# Patient Record
Sex: MALE | Race: OTHER | HISPANIC OR LATINO | URBAN - METROPOLITAN AREA
[De-identification: names, ages, dates, MRNs, and addresses within clinical notes are randomized per-mention and may not be internally consistent; named-entity substitution may affect disease eponyms.]

---

## 2017-01-05 ENCOUNTER — EMERGENCY (EMERGENCY)
Facility: HOSPITAL | Age: 27
LOS: 1 days | Discharge: HOME | End: 2017-01-05
Admitting: EMERGENCY MEDICINE
Payer: MEDICAID

## 2017-01-05 VITALS
SYSTOLIC BLOOD PRESSURE: 121 MMHG | TEMPERATURE: 98 F | RESPIRATION RATE: 19 BRPM | DIASTOLIC BLOOD PRESSURE: 86 MMHG | HEART RATE: 92 BPM | OXYGEN SATURATION: 100 %

## 2017-01-05 DIAGNOSIS — F12.129 CANNABIS ABUSE WITH INTOXICATION, UNSPECIFIED: ICD-10-CM

## 2017-01-05 DIAGNOSIS — F20.9 SCHIZOPHRENIA, UNSPECIFIED: ICD-10-CM

## 2017-01-05 PROCEDURE — 90792 PSYCH DIAG EVAL W/MED SRVCS: CPT

## 2017-01-05 PROCEDURE — 99284 EMERGENCY DEPT VISIT MOD MDM: CPT

## 2017-01-05 NOTE — ED BEHAVIORAL HEALTH ASSESSMENT NOTE - OTHER
peers CVM neutral consider using less drugs "I am feeling good!" concrete maintains attention with effort low end of average chronically limited looks happy

## 2017-01-05 NOTE — ED BEHAVIORAL HEALTH NOTE - BEHAVIORAL HEALTH NOTE
This worker has contacted Mario at [facility] who has arranged taxi  for the patient by 811 transport at approximately 930, cab will call nurses station upon arrival. Confirmation # [id number].
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
## 2017-01-05 NOTE — ED ADULT NURSE NOTE - OBJECTIVE STATEMENT
PT to  area via EMS [facility] with another [facility] at the facility. Sent for psych evaluation. No signs of injury or trauma. Denies drug or alcohol use. Denies SI or HI. Calm and cooperative in triage.
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
## 2017-01-05 NOTE — ED BEHAVIORAL HEALTH ASSESSMENT NOTE - SAFETY PLAN DETAILS
Advised to return to ED or call 911 for any suicidal ideation, homicidal thoughts or worsening symptoms. Verbalized Understanding of instructions

## 2017-01-05 NOTE — ED BEHAVIORAL HEALTH ASSESSMENT NOTE - SUICIDE PROTECTIVE FACTORS
Fear of death or dying due to pain/suffering/Identifies reasons for living/Supportive social network or family

## 2017-01-05 NOTE — ED BEHAVIORAL HEALTH ASSESSMENT NOTE - RISK ASSESSMENT
Protective factors include no suicide attempts, use of long acting injectable, medication compliance, no access to guns, no global insomnia, supportive psychiatric residence. Risk factors include history of chronic aggressive behaviors, substance abuse, psychotic disorder, chronically poor insight. While pt has chronic risk factors, his many protective factors mitigate risks, and at present pt is not psychotic, manic, depressed or suicidal/homicidal. He does not meet criteria for inpatient psychiatric admission, and will be discharged to his psychiatric residence with plan to f/u with psychiatrist as scheduled and continue POLANCO as planned. He agrees to return to ED if he has worsening sx/SI/HI. Protective factors include no suicide attempts, use of long acting injectable, medication compliance, no access to guns, no global insomnia, supportive psychiatric residence, young, no major medical issues/no chronic pain. Risk factors include history of chronic aggressive behaviors, male gender; single, chronic active substance abuse, psychotic disorder, chronically poor insight. While pt has chronic risk factors, his many protective factors mitigate risks, and at present pt is not psychotic, manic, depressed or suicidal/homicidal. No acute risk factors identified; residual high on cannabis but clinically sober enough to tolerate assessment.

## 2017-01-05 NOTE — ED PROVIDER NOTE - MEDICAL DECISION MAKING DETAILS
This is a 26 year old Male PMHx psychosis BIBA for psych eval r/t aggression . Medical evaluation performed. There is no clinical evidence of intoxication or any acute medical problem requiring immediate intervention. Final disposition will be determined by psychiatrist.
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
## 2017-01-05 NOTE — ED BEHAVIORAL HEALTH ASSESSMENT NOTE - DESCRIPTION
calm cooperative pleasant, laughing and smiling a lot and at times giggling with glassy eyes consistent with cannabis intoxication Patient is well known for none see hpi

## 2021-01-03 ENCOUNTER — INPATIENT (INPATIENT)
Facility: HOSPITAL | Age: 31
LOS: 0 days | Discharge: ANOTHER HOSPITAL | End: 2021-01-04
Attending: PSYCHIATRY & NEUROLOGY | Admitting: PSYCHIATRY & NEUROLOGY
Payer: COMMERCIAL

## 2021-01-03 VITALS
SYSTOLIC BLOOD PRESSURE: 156 MMHG | TEMPERATURE: 98 F | DIASTOLIC BLOOD PRESSURE: 101 MMHG | HEART RATE: 68 BPM | OXYGEN SATURATION: 100 % | RESPIRATION RATE: 18 BRPM

## 2021-01-03 DIAGNOSIS — F25.9 SCHIZOAFFECTIVE DISORDER, UNSPECIFIED: ICD-10-CM

## 2021-01-03 LAB
ALBUMIN SERPL ELPH-MCNC: 3.9 G/DL — SIGNIFICANT CHANGE UP (ref 3.3–5)
ALP SERPL-CCNC: 83 U/L — SIGNIFICANT CHANGE UP (ref 40–120)
ALT FLD-CCNC: 18 U/L — SIGNIFICANT CHANGE UP (ref 4–41)
ANION GAP SERPL CALC-SCNC: 10 MMOL/L — SIGNIFICANT CHANGE UP (ref 7–14)
AST SERPL-CCNC: 19 U/L — SIGNIFICANT CHANGE UP (ref 4–40)
B PERT DNA SPEC QL NAA+PROBE: SIGNIFICANT CHANGE UP
BASOPHILS # BLD AUTO: 0.05 K/UL — SIGNIFICANT CHANGE UP (ref 0–0.2)
BASOPHILS NFR BLD AUTO: 0.8 % — SIGNIFICANT CHANGE UP (ref 0–2)
BILIRUB SERPL-MCNC: 0.4 MG/DL — SIGNIFICANT CHANGE UP (ref 0.2–1.2)
BUN SERPL-MCNC: 19 MG/DL — SIGNIFICANT CHANGE UP (ref 7–23)
C PNEUM DNA SPEC QL NAA+PROBE: SIGNIFICANT CHANGE UP
CALCIUM SERPL-MCNC: 9 MG/DL — SIGNIFICANT CHANGE UP (ref 8.4–10.5)
CHLORIDE SERPL-SCNC: 102 MMOL/L — SIGNIFICANT CHANGE UP (ref 98–107)
CO2 SERPL-SCNC: 27 MMOL/L — SIGNIFICANT CHANGE UP (ref 22–31)
CREAT SERPL-MCNC: 1.05 MG/DL — SIGNIFICANT CHANGE UP (ref 0.5–1.3)
EOSINOPHIL # BLD AUTO: 0.29 K/UL — SIGNIFICANT CHANGE UP (ref 0–0.5)
EOSINOPHIL NFR BLD AUTO: 4.5 % — SIGNIFICANT CHANGE UP (ref 0–6)
FLUAV H1 2009 PAND RNA SPEC QL NAA+PROBE: SIGNIFICANT CHANGE UP
FLUAV H1 RNA SPEC QL NAA+PROBE: SIGNIFICANT CHANGE UP
FLUAV H3 RNA SPEC QL NAA+PROBE: SIGNIFICANT CHANGE UP
FLUAV SUBTYP SPEC NAA+PROBE: SIGNIFICANT CHANGE UP
FLUBV RNA SPEC QL NAA+PROBE: SIGNIFICANT CHANGE UP
GLUCOSE SERPL-MCNC: 124 MG/DL — HIGH (ref 70–99)
HADV DNA SPEC QL NAA+PROBE: SIGNIFICANT CHANGE UP
HCOV PNL SPEC NAA+PROBE: SIGNIFICANT CHANGE UP
HCT VFR BLD CALC: 39.3 % — SIGNIFICANT CHANGE UP (ref 39–50)
HGB BLD-MCNC: 13.4 G/DL — SIGNIFICANT CHANGE UP (ref 13–17)
HMPV RNA SPEC QL NAA+PROBE: SIGNIFICANT CHANGE UP
HPIV1 RNA SPEC QL NAA+PROBE: SIGNIFICANT CHANGE UP
HPIV2 RNA SPEC QL NAA+PROBE: SIGNIFICANT CHANGE UP
HPIV3 RNA SPEC QL NAA+PROBE: SIGNIFICANT CHANGE UP
HPIV4 RNA SPEC QL NAA+PROBE: SIGNIFICANT CHANGE UP
IANC: 2.74 K/UL — SIGNIFICANT CHANGE UP (ref 1.5–8.5)
IMM GRANULOCYTES NFR BLD AUTO: 0.3 % — SIGNIFICANT CHANGE UP (ref 0–1.5)
LYMPHOCYTES # BLD AUTO: 2.73 K/UL — SIGNIFICANT CHANGE UP (ref 1–3.3)
LYMPHOCYTES # BLD AUTO: 42.1 % — SIGNIFICANT CHANGE UP (ref 13–44)
MCHC RBC-ENTMCNC: 30.2 PG — SIGNIFICANT CHANGE UP (ref 27–34)
MCHC RBC-ENTMCNC: 34.1 GM/DL — SIGNIFICANT CHANGE UP (ref 32–36)
MCV RBC AUTO: 88.7 FL — SIGNIFICANT CHANGE UP (ref 80–100)
MONOCYTES # BLD AUTO: 0.66 K/UL — SIGNIFICANT CHANGE UP (ref 0–0.9)
MONOCYTES NFR BLD AUTO: 10.2 % — SIGNIFICANT CHANGE UP (ref 2–14)
NEUTROPHILS # BLD AUTO: 2.74 K/UL — SIGNIFICANT CHANGE UP (ref 1.8–7.4)
NEUTROPHILS NFR BLD AUTO: 42.1 % — LOW (ref 43–77)
NRBC # BLD: 0 /100 WBCS — SIGNIFICANT CHANGE UP
NRBC # FLD: 0 K/UL — SIGNIFICANT CHANGE UP
PCP SPEC-MCNC: SIGNIFICANT CHANGE UP
PCP SPEC-MCNC: SIGNIFICANT CHANGE UP
PLATELET # BLD AUTO: 165 K/UL — SIGNIFICANT CHANGE UP (ref 150–400)
POTASSIUM SERPL-MCNC: 3.8 MMOL/L — SIGNIFICANT CHANGE UP (ref 3.5–5.3)
POTASSIUM SERPL-SCNC: 3.8 MMOL/L — SIGNIFICANT CHANGE UP (ref 3.5–5.3)
PROT SERPL-MCNC: 6.5 G/DL — SIGNIFICANT CHANGE UP (ref 6–8.3)
RAPID RVP RESULT: SIGNIFICANT CHANGE UP
RBC # BLD: 4.43 M/UL — SIGNIFICANT CHANGE UP (ref 4.2–5.8)
RBC # FLD: 12.8 % — SIGNIFICANT CHANGE UP (ref 10.3–14.5)
RSV RNA SPEC QL NAA+PROBE: SIGNIFICANT CHANGE UP
RV+EV RNA SPEC QL NAA+PROBE: SIGNIFICANT CHANGE UP
SARS-COV-2 RNA SPEC QL NAA+PROBE: SIGNIFICANT CHANGE UP
SODIUM SERPL-SCNC: 139 MMOL/L — SIGNIFICANT CHANGE UP (ref 135–145)
TOXICOLOGY SCREEN, DRUGS OF ABUSE, SERUM RESULT: SIGNIFICANT CHANGE UP
TSH SERPL-MCNC: 1.99 UIU/ML — SIGNIFICANT CHANGE UP (ref 0.27–4.2)
WBC # BLD: 6.49 K/UL — SIGNIFICANT CHANGE UP (ref 3.8–10.5)
WBC # FLD AUTO: 6.49 K/UL — SIGNIFICANT CHANGE UP (ref 3.8–10.5)

## 2021-01-03 PROCEDURE — 90792 PSYCH DIAG EVAL W/MED SRVCS: CPT

## 2021-01-03 PROCEDURE — 99285 EMERGENCY DEPT VISIT HI MDM: CPT

## 2021-01-03 RX ORDER — HALOPERIDOL DECANOATE 100 MG/ML
5 INJECTION INTRAMUSCULAR ONCE
Refills: 0 | Status: COMPLETED | OUTPATIENT
Start: 2021-01-03 | End: 2021-01-03

## 2021-01-03 RX ADMIN — Medication 2 MILLIGRAM(S): at 12:53

## 2021-01-03 RX ADMIN — HALOPERIDOL DECANOATE 5 MILLIGRAM(S): 100 INJECTION INTRAMUSCULAR at 12:53

## 2021-01-03 NOTE — ED BEHAVIORAL HEALTH ASSESSMENT NOTE - VIOLENCE RISK FACTORS:
Affective dysregulation/Impulsivity/Lack of insight into violence risk/need for treatment/Noncompliance with treatment/Irritability/Elopement history or risk

## 2021-01-03 NOTE — ED BEHAVIORAL HEALTH ASSESSMENT NOTE - DIFFERENTIAL
Schizophrenia, RO substance induced psychosis, RO schizoaffective disorder bipolar type RO schizoaffective disorder bipolar type, Schizophrenia, RO substance induced psychosis,

## 2021-01-03 NOTE — ED ADULT NURSE NOTE - ED STAT RN HANDOFF WHERE
Pt taken to [facility], handoff given by KIRILL Hendrix. Transported by [facility]

## 2021-01-03 NOTE — ED BEHAVIORAL HEALTH ASSESSMENT NOTE - PSYCHIATRIC ISSUES AND PLAN (INCLUDE STANDING AND PRN MEDICATION)
May give haldol 5mg PO Q4H PRN psychosis/agitation, may give Ativan 2mg PO Q6H PRN anxiety, May give Haldol 5mg IM acute agitation/aggression Q6H, May give Ativan IM for acute agitation/aggression Q6H May give haldol 5mg PO Q4H PRN psychosis/agitation, may give Ativan 2mg PO Q6H PRN anxiety, May give Haldol 5mg IM acute agitation/aggression Q6H, May give Ativan 2mg IM for acute agitation/aggression Q6H May give haldol 5mg PO Q4H PRN psychosis/agitation, may give Ativan 2mg PO Q6H PRN anxiety, May give Haldol 5mg IM acute agitation/aggression Q8H, May give Ativan 2mg IM for acute agitation/aggression Q8H recommend risperdal 1mg po qhs, PRNS: Haldol 5mg po/im/q6hrs PRN, benadryl 50mg po/im and ativan 2mg po/im

## 2021-01-03 NOTE — ED ADULT TRIAGE NOTE - CHIEF COMPLAINT QUOTE
Arrives as EDP with police, not cooperative, refusing to answer questions, muttering coherently under his breath.

## 2021-01-03 NOTE — ED BEHAVIORAL HEALTH ASSESSMENT NOTE - HPI (INCLUDE ILLNESS QUALITY, SEVERITY, DURATION, TIMING, CONTEXT, MODIFYING FACTORS, ASSOCIATED SIGNS AND SYMPTOMS)
30-year-old-Kosovan/[facility] male, with self-reported Hx of Schizophrenia, Bipolar, hx of recent discharge from inpatient [facility], unclear current psychiatric medications, unknown medical history, hx of MJ use (+Utox), who was brought in from the streets by police custody for emotional disturbance.     As per staff, pt upon arrival required IM injection for agitation. Pt seen and spoken to by writer, observed pt to be a limited historian, reports that he was running outside and police came. he is not able to provide any more information upon this. Discussed past hospitalizations, reports that he was hospitalized at [facility] for the past few weeks, that he was recently discharged to the [address] [place] [address] to look for his ID. After leaving the [facility], he noted that he was outside asking and began to dance stating, "I danced like a monkey and I wanted to get tickled." Pt at this time, pt stood up and began to dance asking be tickled for about 2 minutes. he did not provide more information and refused to answer any more questions.     Writer attempted to contact mother, but there was not response and a message was left for mother to return call. 30-year-old-[place]ican/[facility] male, with self-reported Hx of Schizophrenia, Bipolar, hx of recent discharge from inpatient [facility], unclear current psychiatric medications, unknown medical history, hx of MJ use (+Utox), who was brought in from the streets by police custody for emotional disturbance.     As per staff, pt upon arrival required IM injection for agitation. Pt seen and spoken to by writer, observed pt to be a limited historian, reports that he was running outside and police came. he is not able to provide any more information upon this. Discussed past hospitalizations, reports that he was hospitalized at [facility] for the past few weeks, that he was recently discharged to the [address] [place] [address] to look for his ID. [facility], he noted that he was outside asking and began to dance stating, "I danced like a monkey and I wanted to get tickled." Pt at this time, pt stood up and began to dance asking be tickled for about 2 minutes. he did not provide more information, denies suicidal ideation/homicidal ideation, auditory hallucinations or visual hallucinations and refused to answer any more questions.     Writer attempted to contact mother, but there was not response and a message was left for mother to return call in Slovak-preferred language based on voicemail.    Psyckes was assessed, but no information was available 30-year-[facility], with self-reported Hx of Schizophrenia, Bipolar, hx of recent discharge from inpatient [facility], unclear current psychiatric medications, unknown medical history, hx of MJ use (+Utox), who was brought in from the streets by police custody for emotional disturbance.     As per staff, pt upon arrival required IM injection for agitation. Pt seen and spoken to by writer, observed pt to be a limited historian, reports that he was running outside and police came. he is not able to provide any more information upon this. Discussed past hospitalizations, reports that he was hospitalized at [facility] for the past few weeks, that he was recently discharged to the [address] [place] [address] to look for his ID. After leaving the [facility], he noted that he was outside asking and began to dance stating, "I danced like a monkey and I wanted to get tickled." Pt at this time, pt stood up and began to dance asking be tickled for about 2 minutes. he did not provide more information, denies suicidal ideation/homicidal ideation, auditory hallucinations or visual hallucinations and refused to answer any more questions.     Writer attempted to contact mother, but there was not response and a message was left for mother to return call in Tristanian-preferred language based on voicemail.    reviewed medical record: Pt discharged from [facility] to shelter ([phone]) and referral has been made to [facility] outpatient program [address] [place] [address], [phone] on 1231. Pt was discharged on Klonopin 1mg PO BID x 7 days #14, Cogentin 1mg PO BID x 7 days, haldol 10m gPO BID x 7 days, Depakote 250mg ECC, 3 tablets BID TDD 1500mg, Lithium 300m PO BID and scheduled to  medications at [facility]. [address] [facility]. Referral was also made for Dr. Bogdan JACKSON/ [facility] [address] [facility] [address] [phone]    Reviewed labs: Hx of elevated CPK up to 1500, advised to push fluids and limit IM injections    Psyckes was assessed, but no information was available 30-[facility], with self-reported Hx of Schizophrenia, Bipolar, hx of recent discharge from inpatient [facility], unclear current psychiatric medications, unknown medical history, hx of MJ use (+Utox), who was brought in from the streets by police custody for emotional disturbance.     As per staff, pt upon arrival required IM injection for agitation. Pt seen and spoken to by writer, observed pt to be a limited historian, reports that he was running outside and police came. he is not able to provide any more information upon this. Discussed past hospitalizations, reports that he was hospitalized at [facility] for the past few weeks, that he was recently discharged to the [address] [place] [address] to look for his ID. After leaving the [facility], he noted that he was outside asking and began to dance stating, "I danced like a monkey and I wanted to get tickled." Pt at this time, pt stood up and began to dance asking be tickled for about 2 minutes. he did not provide more information, denies suicidal ideation/homicidal ideation, auditory hallucinations or visual hallucinations and refused to answer any more questions.     Writer attempted to contact mother, but there was not response and a message was left for mother to return call in Salvadorean-preferred language based on voicemail.    reviewed medical record: Pt discharged from [facility] to shelter ([phone]) and referral has been made to [facility] outpatient program [address] [place] [address], [phone] on 1231. Pt was discharged on Klonopin 1mg PO BID x 7 days #14, Cogentin 1mg PO BID x 7 days, haldol 10m gPO BID x 7 days, Depakote 250mg ECC, 3 tablets BID TDD 1500mg, Lithium 300m PO BID and scheduled to  medications at [facility]. [address] [facility]. Referral was also made for Dr. Bogdan JACKSON/ [facility] [address] [facility] [address] [phone]    Reviewed labs: Hx of elevated CPK up to 1500, advised to push fluids and limit IM injections    Psyckes was assessed, but no information was available    Upon reassessment at 8am on 01/04/2021 - Patient was interviewed in his room. He was initially calm but as per overnight report, he was running around naked and required Haldol 5mg and Ativan 2mg IM at 1am and 220am. He states he slept well overnight, does not know why he's in the ER, denies doing drugs and denies any feelings of SI or depression. Patient states his family would not let him in the house and he came to the hospital to "Eat food and drink you!" At a point he just started to jump in his bed, laughing hysterically and started saying "Tickle me! Tickle me!". Pt is having trouble maintaining impulse control and is not appropriately caring for self. He also states he's not supposed to be taking any medications though was discharged from [facility] a few weeks ago.

## 2021-01-03 NOTE — ED BEHAVIORAL HEALTH ASSESSMENT NOTE - DESCRIPTION
agitation inially, +results with IM injection, more cooperative but presents oddly related    Vital Signs Last 24 Hrs  T(C): 36.7 (03 Jan 2021 12:22), Max: 36.7 (03 Jan 2021 12:22)  T(F): 98.1 (03 Jan 2021 12:22), Max: 98.1 (03 Jan 2021 12:22)  HR: 91 (03 Jan 2021 17:43) (68 - 91)  BP: 141/87 (03 Jan 2021 17:43) (141/87 - 156/101)  BP(mean): --  RR: 16 (03 Jan 2021 17:43) (16 - 18)  SpO2: 100% (03 Jan 2021 17:43) (100% - 100%) agitation initially, +results with IM injection, more cooperative but presents oddly related    Vital Signs Last 24 Hrs  T(C): 36.7 (03 Jan 2021 12:22), Max: 36.7 (03 Jan 2021 12:22)  T(F): 98.1 (03 Jan 2021 12:22), Max: 98.1 (03 Jan 2021 12:22)  HR: 91 (03 Jan 2021 17:43) (68 - 91)  BP: 141/87 (03 Jan 2021 17:43) (141/87 - 156/101)  BP(mean): --  RR: 16 (03 Jan 2021 17:43) (16 - 18)  SpO2: 100% (03 Jan 2021 17:43) (100% - 100%) unknown, pending collateral

## 2021-01-03 NOTE — ED ADULT NURSE NOTE - OBJECTIVE STATEMENT
Pt arrived to  in handcuffs accompanied by PD and EMS. Pt observed yelling incomprehensibly, unable to follow directions. Pt medicated with 5 haldol 2 ativan IM as ordered.  Pt unable to answer questions regarding S/I H/I A/H V/H. Pt changed into  clothing. Personal property collected and logged.

## 2021-01-03 NOTE — ED PROVIDER NOTE - CLINICAL SUMMARY MEDICAL DECISION MAKING FREE TEXT BOX
31 y/o M  hx Bipolar , Psychosis   Recently  discharged from [facility] inpatient psychiatry  3 days ago   Labs, Urine Tox/UA, EKG  Medical evaluation performed. There is no clinical evidence of intoxication or any acute medical problem requiring immediate intervention. Patient is awaiting psychiatric consultation. Final disposition will be determined by psychiatrist.  Awaiting inpatient psychiatric bed.

## 2021-01-03 NOTE — ED BEHAVIORAL HEALTH ASSESSMENT NOTE - NS ED [facility] [employer] MEDICAL RECORD REVIEWED YES RECORDS
Hospital chart
Alert and oriented to person, place and time, memory intact, behavior appropriate to situation, PERRL.

## 2021-01-03 NOTE — ED BEHAVIORAL HEALTH ASSESSMENT NOTE - CASE SUMMARY
unlikely that Patient decompensated in 3 days after inpatient discharge, though not impossible  - substance intox with psychosis primary diagnosis at this time. If so, he MSE should improve by the morning   - boarding in [facility] due to lack of beds in the system. haldol 5mg PO/IM q6hrs PRN, Ativan 2mg PO/IM q6hrs PRNs should suffice; trazodone 50mg PO qhs PRN for insomnia
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
## 2021-01-03 NOTE — ED BEHAVIORAL HEALTH ASSESSMENT NOTE - SUMMARY
30-year-old-German/[facility] male, with self-reported Hx of Schizophrenia, Bipolar, hx of recent discharge from inpatient [facility], unclear current psychiatric medications, unknown medical history, hx of MJ use (+Utox), who was brought in from the streets by police custody for emotional disturbance.     Pt presents as oddly related, very limited historian, appeared to be internally preoccupied and is not safe to be discharged. more information is needed to know about his baseline presentation, chronology of his mental illness, past medication trials/failures and past medical history. At this time, will board pt pending call/information from mother. 30-year-old-[place]/[facility] male, with self-reported Hx of Schizophrenia, Bipolar, hx of recent discharge from inpatient [facility], unclear current psychiatric medications, unknown medical history, hx of MJ use (+Utox), who was brought in from the streets by police custody for emotional disturbance. Pt presents as oddly related, very limited historian, appeared to be internally preoccupied and is not safe to be discharged. more information is needed to know about his baseline presentation, chronology of his mental illness, past medication trials/failures and past medical history. At this time, will board pt pending call/information from mother. 30-year-old-Malagasy/[facility] male, with self-reported Hx of Schizophrenia, Bipolar, hx of recent discharge from inpatient [facility], unclear current psychiatric medications, unknown medical history, hx of MJ use (+Utox), who was brought in from the streets by police custody for emotional disturbance. Pt presents as oddly related, very limited historian, appeared to be internally preoccupied and is not safe to be discharged. more information is needed to know about his baseline presentation, chronology of his mental illness, past medication trials/failures and past medical history. At this time, will board pt pending call/information from mother.    Upon re-assessment, Pt continues to present with disorganized thought process, poor impulse control, requiring multiple IMs overnight and making strange statements. Pt has been non-adherent with meds and will most likely require psychiatric hospitalization for re-stabilization.

## 2021-01-03 NOTE — ED BEHAVIORAL HEALTH ASSESSMENT NOTE - OTHER
housing initially uncooperative unable to assess none reported possible VH, observed to be gazing his eyes throughout the unit internal

## 2021-01-03 NOTE — ED PROVIDER NOTE - NSRECPHYSICIAN_ED_A_ED_FT
Additional Notes: Unclear etiology. Pt has history of cold urticaria, she may be using cold water to wash her face. Recommend that she make sure that the water is warm before washing her face. Also she should be more consistent with taking her antihistamine at least once daily (can increase up to TID). Discussed risks of sedation  with antihistamine use. Vanicream samples provided for patient to try as a moisturizer (since pt not using one currently). Detail Level: Simple Additional Notes: Mostly comedonal acne on exam. Given that patient has history of sensitive skin and contact allergies, will have her try OTC differin 0.1% gel every other night and increase to nightly as tolerated. Can start clindamycin 1% lotion in the AM. Gentle cleanser BID, moisturizer with SPF in AM and thicker one at night. \\nRTC in 2-3 months, sooner if worsening. Dr Landy Spence

## 2021-01-03 NOTE — ED ADULT NURSE REASSESSMENT NOTE - NS ED NURSE REASSESS COMMENT FT1
patient laying bed calm cooperative. as per NP Villa, patient offered water at this time. patient refuses " Im not thirsty" NP aware. blood specimens collected and sent.

## 2021-01-03 NOTE — ED BEHAVIORAL HEALTH ASSESSMENT NOTE - DETAILS
discharged from [facility] 1/2/21 information unknown BH provider made aware of plan message left for mother pt denies will attempt to call mother Handoff provided to L4 provider

## 2021-01-03 NOTE — ED BEHAVIORAL HEALTH ASSESSMENT NOTE - RISK ASSESSMENT
Risk factors include history of recent discharge from a psychiatric facility, substance abuse, no dependents, impulsivity, impaired insight & judgement, psychiatric diagnoses, agitation, Male, Young Adult,     Protective factors include no aggression history Low Acute Suicide Risk

## 2021-01-04 DIAGNOSIS — F25.0 SCHIZOAFFECTIVE DISORDER, BIPOLAR TYPE: ICD-10-CM

## 2021-01-04 LAB
SARS-COV-2 IGG SERPL QL IA: POSITIVE
SARS-COV-2 IGM SERPL IA-ACNC: 13 INDEX — HIGH

## 2021-01-04 PROCEDURE — 99213 OFFICE O/P EST LOW 20 MIN: CPT

## 2021-01-04 PROCEDURE — 99285 EMERGENCY DEPT VISIT HI MDM: CPT | Mod: 25

## 2021-01-04 PROCEDURE — 93010 ELECTROCARDIOGRAM REPORT: CPT

## 2021-01-04 RX ORDER — HALOPERIDOL DECANOATE 100 MG/ML
5 INJECTION INTRAMUSCULAR ONCE
Refills: 0 | Status: COMPLETED | OUTPATIENT
Start: 2021-01-04 | End: 2021-01-04

## 2021-01-04 RX ADMIN — Medication 2 MILLIGRAM(S): at 02:20

## 2021-01-04 RX ADMIN — HALOPERIDOL DECANOATE 5 MILLIGRAM(S): 100 INJECTION INTRAMUSCULAR at 02:20

## 2021-01-04 RX ADMIN — Medication 2 MILLIGRAM(S): at 16:30

## 2021-01-04 RX ADMIN — HALOPERIDOL DECANOATE 5 MILLIGRAM(S): 100 INJECTION INTRAMUSCULAR at 16:30

## 2021-01-04 NOTE — ED BEHAVIORAL HEALTH NOTE - BEHAVIORAL HEALTH NOTE
CAITLIN spoke with Kathleen at [facility], [phone] who confirmed patient is accepted for transfer, accepting Dr. Landy Specne.    CAITLIN [facility] with Maximo [facility], [phone] and obtained authorization for 9 days, 1/4/2021 - 1/12/2021, authorization #[id number]. CAITLIN provided authorization to [place].

## 2021-01-04 NOTE — ED BEHAVIORAL HEALTH NOTE - BEHAVIORAL HEALTH NOTE
Per request of Dr. Hilliard, CAITLIN spoke with Prabhu at [facility], [phone]. No adult beds available at this time. Per request of Dr. Hilliard, CAITLIN spoke with Prabhu at [facility], [phone]. No adult beds available at this time.    CAITLIN faxed clinicals to Meredith f: [phone] for review.

## 2021-01-04 NOTE — BH CONSULTATION LIAISON PROGRESS NOTE - NSBHFUPINTERVALHXFT_PSY_A_CORE
Patient was interviewed in his room. He was initially calm but as per overnight report, he was running around naked and required Haldol 5mg and Ativan 2mg IM at 1am and 220am. He states he slept well overnight, does not know why he's in the ER, denies doing drugs and denies any feelings of SI or depression. Patient states his family would not let him in the house and he came to the hospital to "Eat food and drink you!" At a point he just started to jump in his bed, laughing hysterically and started saying "Tickle me! Tickle me!". Pt is having trouble maintaining impulse control and is not appropriately caring for self. He also states he's not supposed to be taking any medications though was discharged from [facility] a few weeks ago.

## 2021-01-04 NOTE — ED ADULT NURSE REASSESSMENT NOTE - NS ED NURSE REASSESS COMMENT FT1
Received report from night RN, pt calm tolerated breakfast no c/o verbalized , pt awaiting bed assignment safety & comfort measures maintained eval on going.

## 2021-01-04 NOTE — BH CONSULTATION LIAISON PROGRESS NOTE - NSBHASSESSMENTFT_PSY_ALL_CORE
30-year-old-German/[facility] male, with self-reported Hx of Schizophrenia, Bipolar, hx of recent discharge from inpatient [facility], unclear current psychiatric medications, unknown medical history, hx of MJ use (+Utox), who was brought in from the streets by police custody for emotional disturbance. Pt presents as oddly related, very limited historian, appeared to be internally preoccupied and is not safe to be discharged. more information is needed to know about his baseline presentation, chronology of his mental illness, past medication trials/failures and past medical history.     Upon re-assessment, Pt continues to present with disorganized thought process, poor impulse control, requiring multiple IMs overnight and making strange statements. Pt has been non-adherent with meds and will most likely require psychiatric hospitalization for re-stabilization.

## 2021-01-04 NOTE — BH CONSULTATION LIAISON PROGRESS NOTE - CURRENT MEDICATION
MEDICATIONS  (STANDING):  no standing meds	  MEDICATIONS  (PRN):  Haldol 5mg IM q6hrs prn  Ativan 2mg IM q6hrs prn

## 2021-01-04 NOTE — BH CONSULTATION LIAISON PROGRESS NOTE - NSBHCHARTREVIEWLAB_PSY_A_CORE FT
01-03    139  |  102  |  19  ----------------------------<  124<H>  3.8   |  27  |  1.05    Ca    9.0      03 Jan 2021 20:53    TPro  6.5  /  Alb  3.9  /  TBili  0.4  /  DBili  x   /  AST  19  /  ALT  18  /  AlkPhos  83  01-03

## 2021-01-04 NOTE — BH CONSULTATION LIAISON PROGRESS NOTE - NSBHCHARTREVIEWVS_PSY_A_CORE FT
Vital Signs Last 24 Hrs  T(C): 36.2 (04 Jan 2021 06:25), Max: 36.7 (03 Jan 2021 12:22)  T(F): 97.2 (04 Jan 2021 06:25), Max: 98.1 (03 Jan 2021 12:22)  HR: 95 (04 Jan 2021 06:25) (68 - 98)  BP: 120/62 (04 Jan 2021 06:25) (111/46 - 156/101)  BP(mean): --  RR: 18 (04 Jan 2021 06:25) (16 - 18)  SpO2: 98% (04 Jan 2021 06:25) (97% - 100%)

## 2021-01-04 NOTE — BH CONSULTATION LIAISON PROGRESS NOTE - NSBHFUPINTERVALCCFT_PSY_A_CORE
30-year-old-[place]/[facility] male, with self-reported Hx of Schizophrenia, Bipolar, hx of recent discharge from inpatient [facility], unclear current psychiatric medications, unknown medical history, hx of MJ use (+Utox), who was brought in from the streets by police custody for emotional disturbance

## 2021-01-04 NOTE — BH CONSULTATION LIAISON PROGRESS NOTE - NSBHCONSULTPSYCHPLAN_PSY_A_CORE FT
Recommend Risperdal 1mg po qhs  PRNS: Haldol 5mg IM/po q6hrs prn  Ativan 2mg IM/po q6hrs prn  bENADRYL 50mg po/im q6hrs

## 2021-01-04 NOTE — ED ADULT NURSE REASSESSMENT NOTE - NS ED NURSE REASSESS COMMENT FT1
Patient was noted with hypersexual behavior, taking off clothes, dancing in the hallway towards female patient. Patient taken back to room with verbal deescalation. However patient continues to strip, with manic behavior, started doing pushups on the floor, wandering the hallway. MD made aware. patient medicated per order. will monitor and follow up
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
## 2021-01-07 VITALS — TEMPERATURE: 98 F

## 2022-12-10 ENCOUNTER — EMERGENCY (EMERGENCY)
Facility: HOSPITAL | Age: 32
LOS: 1 days | Discharge: HOME | End: 2022-12-10
Attending: STUDENT IN AN ORGANIZED HEALTH CARE EDUCATION/TRAINING PROGRAM | Admitting: STUDENT IN AN ORGANIZED HEALTH CARE EDUCATION/TRAINING PROGRAM

## 2022-12-10 VITALS
OXYGEN SATURATION: 100 % | HEART RATE: 91 BPM | RESPIRATION RATE: 20 BRPM | TEMPERATURE: 98 F | SYSTOLIC BLOOD PRESSURE: 130 MMHG | DIASTOLIC BLOOD PRESSURE: 92 MMHG

## 2022-12-10 VITALS
RESPIRATION RATE: 20 BRPM | OXYGEN SATURATION: 100 % | HEART RATE: 89 BPM | TEMPERATURE: 97 F | DIASTOLIC BLOOD PRESSURE: 76 MMHG | SYSTOLIC BLOOD PRESSURE: 116 MMHG

## 2022-12-10 PROBLEM — F31.9 BIPOLAR DISORDER, UNSPECIFIED: Chronic | Status: ACTIVE | Noted: 2021-01-03

## 2022-12-10 LAB
ALBUMIN SERPL ELPH-MCNC: 4.6 G/DL — SIGNIFICANT CHANGE UP (ref 3.3–5)
ALP SERPL-CCNC: 75 U/L — SIGNIFICANT CHANGE UP (ref 40–120)
ALT FLD-CCNC: 74 U/L — HIGH (ref 4–41)
ANION GAP SERPL CALC-SCNC: 13 MMOL/L — SIGNIFICANT CHANGE UP (ref 7–14)
APAP SERPL-MCNC: <10 UG/ML — LOW (ref 15–25)
AST SERPL-CCNC: 202 U/L — HIGH (ref 4–40)
BASOPHILS # BLD AUTO: 0.04 K/UL — SIGNIFICANT CHANGE UP (ref 0–0.2)
BASOPHILS NFR BLD AUTO: 0.4 % — SIGNIFICANT CHANGE UP (ref 0–2)
BILIRUB SERPL-MCNC: 0.8 MG/DL — SIGNIFICANT CHANGE UP (ref 0.2–1.2)
BUN SERPL-MCNC: 10 MG/DL — SIGNIFICANT CHANGE UP (ref 7–23)
CALCIUM SERPL-MCNC: 9.9 MG/DL — SIGNIFICANT CHANGE UP (ref 8.4–10.5)
CHLORIDE SERPL-SCNC: 100 MMOL/L — SIGNIFICANT CHANGE UP (ref 98–107)
CO2 SERPL-SCNC: 25 MMOL/L — SIGNIFICANT CHANGE UP (ref 22–31)
CREAT SERPL-MCNC: 0.71 MG/DL — SIGNIFICANT CHANGE UP (ref 0.5–1.3)
EGFR: 127 ML/MIN/1.73M2 — SIGNIFICANT CHANGE UP
EOSINOPHIL # BLD AUTO: 0.04 K/UL — SIGNIFICANT CHANGE UP (ref 0–0.5)
EOSINOPHIL NFR BLD AUTO: 0.4 % — SIGNIFICANT CHANGE UP (ref 0–6)
ETHANOL SERPL-MCNC: <10 MG/DL — SIGNIFICANT CHANGE UP
FLUAV AG NPH QL: SIGNIFICANT CHANGE UP
FLUBV AG NPH QL: SIGNIFICANT CHANGE UP
GLUCOSE SERPL-MCNC: 93 MG/DL — SIGNIFICANT CHANGE UP (ref 70–99)
HCT VFR BLD CALC: 45.7 % — SIGNIFICANT CHANGE UP (ref 39–50)
HGB BLD-MCNC: 15.9 G/DL — SIGNIFICANT CHANGE UP (ref 13–17)
HIV 1+2 AB+HIV1 P24 AG SERPL QL IA: SIGNIFICANT CHANGE UP
IANC: 6.32 K/UL — SIGNIFICANT CHANGE UP (ref 1.8–7.4)
IMM GRANULOCYTES NFR BLD AUTO: 0.3 % — SIGNIFICANT CHANGE UP (ref 0–0.9)
LYMPHOCYTES # BLD AUTO: 2.02 K/UL — SIGNIFICANT CHANGE UP (ref 1–3.3)
LYMPHOCYTES # BLD AUTO: 22.2 % — SIGNIFICANT CHANGE UP (ref 13–44)
MCHC RBC-ENTMCNC: 29.9 PG — SIGNIFICANT CHANGE UP (ref 27–34)
MCHC RBC-ENTMCNC: 34.8 GM/DL — SIGNIFICANT CHANGE UP (ref 32–36)
MCV RBC AUTO: 86.1 FL — SIGNIFICANT CHANGE UP (ref 80–100)
MONOCYTES # BLD AUTO: 0.64 K/UL — SIGNIFICANT CHANGE UP (ref 0–0.9)
MONOCYTES NFR BLD AUTO: 7 % — SIGNIFICANT CHANGE UP (ref 2–14)
NEUTROPHILS # BLD AUTO: 6.32 K/UL — SIGNIFICANT CHANGE UP (ref 1.8–7.4)
NEUTROPHILS NFR BLD AUTO: 69.7 % — SIGNIFICANT CHANGE UP (ref 43–77)
NRBC # BLD: 0 /100 WBCS — SIGNIFICANT CHANGE UP (ref 0–0)
NRBC # FLD: 0 K/UL — SIGNIFICANT CHANGE UP (ref 0–0)
PLATELET # BLD AUTO: 208 K/UL — SIGNIFICANT CHANGE UP (ref 150–400)
POTASSIUM SERPL-MCNC: 4.2 MMOL/L — SIGNIFICANT CHANGE UP (ref 3.5–5.3)
POTASSIUM SERPL-SCNC: 4.2 MMOL/L — SIGNIFICANT CHANGE UP (ref 3.5–5.3)
PROT SERPL-MCNC: 7.6 G/DL — SIGNIFICANT CHANGE UP (ref 6–8.3)
RBC # BLD: 5.31 M/UL — SIGNIFICANT CHANGE UP (ref 4.2–5.8)
RBC # FLD: 12.5 % — SIGNIFICANT CHANGE UP (ref 10.3–14.5)
RSV RNA NPH QL NAA+NON-PROBE: SIGNIFICANT CHANGE UP
SALICYLATES SERPL-MCNC: <0.3 MG/DL — LOW (ref 15–30)
SARS-COV-2 RNA SPEC QL NAA+PROBE: SIGNIFICANT CHANGE UP
SODIUM SERPL-SCNC: 138 MMOL/L — SIGNIFICANT CHANGE UP (ref 135–145)
WBC # BLD: 9.09 K/UL — SIGNIFICANT CHANGE UP (ref 3.8–10.5)
WBC # FLD AUTO: 9.09 K/UL — SIGNIFICANT CHANGE UP (ref 3.8–10.5)

## 2022-12-10 PROCEDURE — 99285 EMERGENCY DEPT VISIT HI MDM: CPT

## 2022-12-10 PROCEDURE — 70450 CT HEAD/BRAIN W/O DYE: CPT | Mod: 26,MA

## 2022-12-10 RX ORDER — FLUCONAZOLE 150 MG/1
400 TABLET ORAL ONCE
Refills: 0 | Status: COMPLETED | OUTPATIENT
Start: 2022-12-10 | End: 2022-12-10

## 2022-12-10 RX ORDER — TETANUS TOXOID, REDUCED DIPHTHERIA TOXOID AND ACELLULAR PERTUSSIS VACCINE, ADSORBED 5; 2.5; 8; 8; 2.5 [IU]/.5ML; [IU]/.5ML; UG/.5ML; UG/.5ML; UG/.5ML
0.5 SUSPENSION INTRAMUSCULAR ONCE
Refills: 0 | Status: COMPLETED | OUTPATIENT
Start: 2022-12-10 | End: 2022-12-10

## 2022-12-10 RX ORDER — IVERMECTIN 3 MG/1
12 TABLET ORAL ONCE
Refills: 0 | Status: COMPLETED | OUTPATIENT
Start: 2022-12-10 | End: 2022-12-10

## 2022-12-10 RX ADMIN — TETANUS TOXOID, REDUCED DIPHTHERIA TOXOID AND ACELLULAR PERTUSSIS VACCINE, ADSORBED 0.5 MILLILITER(S): 5; 2.5; 8; 8; 2.5 SUSPENSION INTRAMUSCULAR at 14:37

## 2022-12-10 RX ADMIN — FLUCONAZOLE 400 MILLIGRAM(S): 150 TABLET ORAL at 18:28

## 2022-12-10 RX ADMIN — IVERMECTIN 12 MILLIGRAM(S): 3 TABLET ORAL at 20:13

## 2022-12-10 NOTE — ED ADULT NURSE NOTE - CHIEF COMPLAINT QUOTE
Pt brought in by EMS. Pt was found laying on the ground of a convenient store. Pt states that he had a HA. Pt story not consistent. Pt has a hx of schizophrenia and bipolar. Pt denies any medication use. Pt with skin disorder. States that he has been plucking his hair out.  Addendum: As per NP, pt has hx of traumatic brain injury and has an infection top of head that needs to be evaluated.

## 2022-12-10 NOTE — ED ADULT NURSE NOTE - OBJECTIVE STATEMENT
Patient was found lying on ground as per EMS and presents to ED from street for c/o rash on body and he states "I laid on the ground so they would call EMS for me." Small round rashes of different sizes observed on extremities, patient stating "they are bedbug bites." He is AA&Ox3, in no acute distress. Respirations even, unlabored on room air. Hx bipolar disorder. IV placed right AC, 20g. Labs drawn and sent.
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
## 2022-12-10 NOTE — ED PROVIDER NOTE - NS ED ROS FT
GENERAL: No fever or chills  EYES: no change in vision   HEENT: no trouble swallowing or speaking   CARDIAC: no chest pain   PULMONARY: no cough or SOB  GI:  No abdominal pain  : No changes in urination   SKIN: no rashes   NEURO: + headache   MSK: No joint pain     All other ROS negative unless otherwise specified in HPI.

## 2022-12-10 NOTE — ED ADULT NURSE NOTE - ED STAT RN HANDOFF DETAILS
Pt provided third meal.  Pt stating he wants to leave "to go to a store" and doesn't want to stay.  Pt A&Ox3 and denies complaints.  Pt was deconned earlier on shift.  Patient discharged by provider with instructions.  Pt iv d/c by RN.  Patient leaving ED ambulatory.

## 2022-12-10 NOTE — ED PROVIDER NOTE - PHYSICAL EXAMINATION
GENERAL: NAD  HEENT:  Atraumatic  CHEST/LUNG: Chest rise equal bilaterally  HEART: Regular rate and rhythm  ABDOMEN: Soft, Nontender, Nondistended  EXTREMITIES:  Extremities warm  PSYCH: A&Ox3  SKIN: Pt has multiple 0.5cm diameter circular maculopapular erythema rash across all four extremities.   MSK: No cervical spine TTP.  NEUROLOGY: strength and sensation intact in all extremities.
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
## 2022-12-10 NOTE — ED PROVIDER NOTE - PATIENT PORTAL LINK FT
You can access the FollowMyHealth Patient Portal offered by [facility] by registering at the following website: [email or web address]. By joining [employer]’s FollowMyHealth portal, you will also be able to view your health information using other applications (apps) compatible with our system.
